# Patient Record
(demographics unavailable — no encounter records)

---

## 2019-08-23 NOTE — MRI
MRI CERVICAL SPINE NONCONTRAST:

 

COMPARISON: 

No prior comparison imaging.

 

INDICATION: 

Cervicalgia.

 

FINDINGS: 

Straightening of normal cervical curvature.  There is no acute marrow edema.  No evidence of compress
ion deformity, acute disk space process, or subluxation.  The cervical spinal cord demonstrates appro
priate caliber and signal intensity.  

 

C1-2, and C2-3 levels are patent.

 

C3-4:  There is a left subarticular/left foraminal zone disk protrusion which impinges the left C4 ne
rve root and produces moderate left foraminal stenosis.  Mild effacement of the ventral subarachnoid 
space to the left of midline.  Right neural foramen is patent. 

 

C4-5:  Left subarticular/foraminal disk protrusion is present with mild to moderate left foraminal st
enosis and crowding of the left C5 nerve root.  No significant central canal stenosis.  There is righ
t side uncinate process hypertrophy with mild right neural foraminal narrowing.

 

C5-6:  Left asymmetric disk bulge with mild narrowing of the left neural foramen.  No significant sandra
tral canal or right neural foraminal stenosis.

 

C6-7:  There is disk-osteophyte formation and uncinate process hypertrophy producing mild left forami
nal narrowing.  No significant central canal or right foraminal stenosis.

 

C7-T1:  No significant stenosis.

 

IMPRESSION: 

Degenerative disk changes of the cervical spine notably at the C3-4 and C4-5 levels producing left si
de nerve root compromise.  Correlate for radiculopathic symptoms of these dermatomal distributions.

 

POS: [facility]